# Patient Record
Sex: MALE | URBAN - METROPOLITAN AREA
[De-identification: names, ages, dates, MRNs, and addresses within clinical notes are randomized per-mention and may not be internally consistent; named-entity substitution may affect disease eponyms.]

---

## 2017-11-29 ENCOUNTER — APPOINTMENT (RX ONLY)
Dept: URBAN - METROPOLITAN AREA CLINIC 122 | Facility: CLINIC | Age: 72
Setting detail: DERMATOLOGY
End: 2017-11-29

## 2017-11-29 DIAGNOSIS — L40.0 PSORIASIS VULGARIS: ICD-10-CM

## 2017-11-29 DIAGNOSIS — L85.3 XEROSIS CUTIS: ICD-10-CM

## 2017-11-29 DIAGNOSIS — L21.8 OTHER SEBORRHEIC DERMATITIS: ICD-10-CM

## 2017-11-29 PROCEDURE — ? COUNSELING

## 2017-11-29 PROCEDURE — ? PRESCRIPTION

## 2017-11-29 PROCEDURE — ? ORDER TESTS

## 2017-11-29 PROCEDURE — ? TREATMENT REGIMEN

## 2017-11-29 PROCEDURE — 99213 OFFICE O/P EST LOW 20 MIN: CPT

## 2017-11-29 RX ORDER — APREMILAST 30 MG/1
ONE TABLET, FILM COATED ORAL BID
Qty: 60 | Refills: 5 | Status: ACTIVE

## 2017-11-29 RX ORDER — DESONIDE 0.5 MG/G
SMALL CREAM TOPICAL BID
Qty: 60 | Refills: 2 | Status: ERX | COMMUNITY
Start: 2017-11-29

## 2017-11-29 RX ORDER — CLOBETASOL PROPIONATE 0.46 MG/ML
SMALL SOLUTION TOPICAL BID
Qty: 50 | Refills: 6 | Status: ERX

## 2017-11-29 RX ADMIN — DESONIDE SMALL: 0.5 CREAM TOPICAL at 00:00

## 2017-11-29 NOTE — PROCEDURE: TREATMENT REGIMEN
Action 2: Continue
Detail Level: Zone
Initiate Treatment: Cetaphil restoraderm to wash and moisturize
Start Regimen: OTEZLA starter pack

## 2018-01-03 ENCOUNTER — APPOINTMENT (RX ONLY)
Dept: URBAN - METROPOLITAN AREA CLINIC 122 | Facility: CLINIC | Age: 73
Setting detail: DERMATOLOGY
End: 2018-01-03

## 2018-01-03 DIAGNOSIS — L40.0 PSORIASIS VULGARIS: ICD-10-CM

## 2018-01-03 PROBLEM — L20.84 INTRINSIC (ALLERGIC) ECZEMA: Status: ACTIVE | Noted: 2018-01-03

## 2018-01-03 PROCEDURE — 99213 OFFICE O/P EST LOW 20 MIN: CPT

## 2018-01-03 PROCEDURE — ? TREATMENT REGIMEN

## 2018-01-03 NOTE — PROCEDURE: TREATMENT REGIMEN
Detail Level: Zone
Other Instructions: SEEK APPROVAL ON ENBREL AUTOINJECTOR. FAILURE ON CLOBETSOL,KENALOG INJECTIONS, TRIAMCINOLONE,LIGHT THERAPY
Action 3: Continue

## 2019-02-04 ENCOUNTER — APPOINTMENT (RX ONLY)
Dept: URBAN - METROPOLITAN AREA CLINIC 122 | Facility: CLINIC | Age: 74
Setting detail: DERMATOLOGY
End: 2019-02-04

## 2019-02-04 DIAGNOSIS — L40.0 PSORIASIS VULGARIS: ICD-10-CM

## 2019-02-04 PROBLEM — L57.0 ACTINIC KERATOSIS: Status: ACTIVE | Noted: 2019-02-04

## 2019-02-04 PROBLEM — L29.8 OTHER PRURITUS: Status: ACTIVE | Noted: 2019-02-04

## 2019-02-04 PROBLEM — M12.9 ARTHROPATHY, UNSPECIFIED: Status: ACTIVE | Noted: 2019-02-04

## 2019-02-04 PROCEDURE — 99213 OFFICE O/P EST LOW 20 MIN: CPT

## 2019-02-04 PROCEDURE — ? TREATMENT REGIMEN

## 2019-02-04 NOTE — PROCEDURE: TREATMENT REGIMEN
Action 1: Continue
Other Instructions: Seek approval for OTEZLA,failure on Enbrel,clobetasol,triamcinolone,desonide
Continue Regimen: Clobetasol sol to scalp,desonide to face
Detail Level: Zone

## 2019-02-04 NOTE — PROCEDURE: MIPS QUALITY
Detail Level: Detailed
Quality 111:Pneumonia Vaccination Status For Older Adults: Pneumococcal Vaccination Previously Received
Quality 110: Preventive Care And Screening: Influenza Immunization: Influenza Immunization Administered during Influenza season
Quality 131: Pain Assessment And Follow-Up: Pain assessment using a standardized tool is documented as negative, no follow-up plan required
Quality 130: Documentation Of Current Medications In The Medical Record: Current Medications Documented